# Patient Record
Sex: MALE | Race: WHITE | Employment: FULL TIME | URBAN - METROPOLITAN AREA
[De-identification: names, ages, dates, MRNs, and addresses within clinical notes are randomized per-mention and may not be internally consistent; named-entity substitution may affect disease eponyms.]

---

## 2019-05-27 ENCOUNTER — APPOINTMENT (OUTPATIENT)
Dept: GENERAL RADIOLOGY | Facility: CLINIC | Age: 54
End: 2019-05-27
Attending: EMERGENCY MEDICINE
Payer: MEDICARE

## 2019-05-27 ENCOUNTER — HOSPITAL ENCOUNTER (EMERGENCY)
Facility: CLINIC | Age: 54
Discharge: HOME | End: 2019-05-27
Attending: EMERGENCY MEDICINE | Admitting: EMERGENCY MEDICINE
Payer: MEDICARE

## 2019-05-27 VITALS
DIASTOLIC BLOOD PRESSURE: 97 MMHG | RESPIRATION RATE: 14 BRPM | SYSTOLIC BLOOD PRESSURE: 151 MMHG | OXYGEN SATURATION: 99 % | TEMPERATURE: 98.1 F

## 2019-05-27 DIAGNOSIS — M25.461 EFFUSION OF RIGHT KNEE JOINT: ICD-10-CM

## 2019-05-27 DIAGNOSIS — M25.561 RIGHT KNEE PAIN, UNSPECIFIED CHRONICITY: ICD-10-CM

## 2019-05-27 PROCEDURE — 99283 EMERGENCY DEPT VISIT LOW MDM: CPT

## 2019-05-27 PROCEDURE — 73562 X-RAY EXAM OF KNEE 3: CPT | Mod: RT

## 2019-05-27 ASSESSMENT — ENCOUNTER SYMPTOMS
WEAKNESS: 0
COLOR CHANGE: 0
NUMBNESS: 0

## 2019-05-27 NOTE — LETTER
May 27, 2019      To Whom It May Concern:      Perry Peralta was seen in our Emergency Department today, 05/27/19.  I expect his condition to improve over the next 2-3 days.  He may return to work when improved.    Sincerely,        Eulalia Wright RN

## 2019-05-27 NOTE — ED PROVIDER NOTES
History     Chief Complaint:  Knee Pain    The history is provided by the patient.      Perry Peralta is a 54 year old male who presents with right knee pain. For the past month, patient has been having pain in the back of his knee. The knee has also been swollen. 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 He also has trouble walking down his driveway to get the newspaper today. He has been wrapping and icing the knee and taking tylenol for the pain but has had no relief, prompting him to the ED today. Here, patient denies previous injury and surgeries on the knee. He has not fallen.     Allergies:  NKDA     Medications:    Abilify  Aspirin 81 mg tablet  Basaglar Kwikpen  Glipizide  Lisinopril  Metformin  Actos   Lipitor    Past Medical History:    Obesity  HTN  Type 2 diabetes  Dyslipidemia     Past Surgical History:    Colonoscopy    Family History:    Hyperlipidemia (father)  HTN (father)  Leukemia     Social History:  Current smoker: 0.05 pack/day  Negative for alcohol use.  Marital Status:  Single.    Review of Systems   Musculoskeletal:        Positive for right knee pain and swelling.   Skin: Negative for color change.   Neurological: Negative for weakness and numbness.     Physical Exam     Patient Vitals for the past 24 hrs:   BP Temp Temp src Heart Rate Resp SpO2   05/27/19 1110 (!) 151/97 98.1  F (36.7  C) Oral 104 14 99 %       Physical Exam  General:          Alert and cooperative.   Resp:               Non-labored  Skin:                No rash  Neuro:             Speech is normal and fluent.   MSkel:             Moving all extremities, R knee effusion w/o erythema or warmth, bent to 90'.  No bony tenderness.  No edema.    Emergency Department Course   Imaging:  Radiographic findings were communicated with the patient who voiced understanding of the findings.    XR Knee Right 3 Views   Final Result   IMPRESSION: Small RIGHT knee effusion. No fractures are seen. Joint    spaces are preserved.      CURTIS LONG MD        Emergency Department Course:  1120 Nursing notes and vitals reviewed. I performed an exam of the patient as documented above.     The patient was sent for a knee x-ray while in the emergency department, findings above.     1205 I rechecked the patient and discussed the results of her workup thus far.     Findings and plan explained to the Patient. Patient discharged home with instructions regarding supportive care, medications, and reasons to return. The importance of close follow-up was reviewed.     I personally and answered all related questions prior to discharge.     Impression & Plan    Medical Decision Making:  Perry Peralta is a 54 year old male here for evaluation of atraumatic right knee pain with swelling. On exam there is an effusion. There is no evidence for a septic joint. I am concerned for underlying arthritis. X-ray was obtained but he has preserved joint spaces. I recommended limiting use when possible and following up closely with orthopedics.  Recommendations could include aspirate or inject medications. He will be using antiinflammatories and supplements for joint health were discussed.     Diagnosis:    ICD-10-CM    1. Right knee pain, unspecified chronicity M25.561    2. Effusion of right knee joint M25.461      Disposition:  discharged to home    Scribe Disposition  I, Julite Boyd, am serving as a scribe on 5/27/2019 at 11:20 AM to personally document services performed by Anu Crouch MD based on my observations and the provider's statements to me.     Juliet Boyd  5/27/2019   [facility] EMERGENCY DEPARTMENT       Anu Douglass MD  05/27/19 2472

## 2019-05-27 NOTE — ED NOTES
Patient discharged to home. Patient received follow-up with [facility]. Patient received discharge instructions and has no other questions at this time.

## 2019-05-27 NOTE — DISCHARGE INSTRUCTIONS
Prescription strength dosing instructions:  - 600mg ibuprofen (Advil, Motrin) every 6 hours as needed for fever/pain/inflammation.  Naprosyn (Naproxen, Aleve) works similarly and can be used instead, if preferred.  - 650mg acetaminophen (tylenol) every 4 hours or 1000mg every 6 hours (maximum of 4000mg in 24 hours).  Acetaminophen is often in combination over the counter and prescription pain medications.  Be sure to include this in daily total.    These medications work differently and can be used in combination.      Supplements for joint health  CoQ10  Chondroitin sulfate

## 2019-05-27 NOTE — ED NOTES
Patient presents with right knee pain for the last 2 months. 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 Patient states pain is in the back of the knee. Alert and oriented x 4.

## 2019-05-27 NOTE — ED AVS SNAPSHOT
[facility] Emergency Department  [address]  [facility] [address]  Phone:  [phone]  Fax:  [phone]                                    Perry Peralta   MRN: [id number]    Department:  [facility] Emergency Department   Date of Visit:  5/27/2019           After Visit Summary Signature Page    I have received my discharge instructions, and my questions have been answered. I have discussed any challenges I see with this plan with the nurse or doctor.    ..........................................................................................................................................  Patient/Patient Representative Signature      ..........................................................................................................................................  Patient Representative Print Name and Relationship to Patient    ..................................................               ................................................  Date                                   Time    ..........................................................................................................................................  Reviewed by Signature/Title    ...................................................              ..............................................  Date                                               Time          22EPIC Rev 08/18